# Patient Record
Sex: MALE | Race: WHITE | Employment: FULL TIME | URBAN - METROPOLITAN AREA
[De-identification: names, ages, dates, MRNs, and addresses within clinical notes are randomized per-mention and may not be internally consistent; named-entity substitution may affect disease eponyms.]

---

## 2021-09-27 ENCOUNTER — OFFICE VISIT (OUTPATIENT)
Dept: SURGERY | Facility: CLINIC | Age: 46
End: 2021-09-27
Payer: COMMERCIAL

## 2021-09-27 VITALS
SYSTOLIC BLOOD PRESSURE: 130 MMHG | HEIGHT: 72 IN | BODY MASS INDEX: 23.03 KG/M2 | DIASTOLIC BLOOD PRESSURE: 90 MMHG | WEIGHT: 170 LBS

## 2021-09-27 DIAGNOSIS — G95.9 CERVICAL MYELOPATHY WITH CERVICAL RADICULOPATHY (HCC): Primary | ICD-10-CM

## 2021-09-27 DIAGNOSIS — M54.12 CERVICAL MYELOPATHY WITH CERVICAL RADICULOPATHY (HCC): Primary | ICD-10-CM

## 2021-09-27 PROCEDURE — 3075F SYST BP GE 130 - 139MM HG: CPT | Performed by: PHYSICIAN ASSISTANT

## 2021-09-27 PROCEDURE — 3080F DIAST BP >= 90 MM HG: CPT | Performed by: PHYSICIAN ASSISTANT

## 2021-09-27 PROCEDURE — 3008F BODY MASS INDEX DOCD: CPT | Performed by: PHYSICIAN ASSISTANT

## 2021-09-27 PROCEDURE — 99204 OFFICE O/P NEW MOD 45 MIN: CPT | Performed by: PHYSICIAN ASSISTANT

## 2021-09-27 RX ORDER — METHYLPREDNISOLONE 4 MG/1
TABLET ORAL
Qty: 1 EACH | Refills: 0 | Status: SHIPPED | OUTPATIENT
Start: 2021-09-27

## 2021-09-27 RX ORDER — IBUPROFEN 600 MG/1
600 TABLET ORAL EVERY 6 HOURS PRN
COMMUNITY

## 2021-09-27 NOTE — PROGRESS NOTES
[facility] Neurosurgery Consultation      HISTORY OF PRESENT ILLNESS:Jass Cedeño. is a 42year old Rh male here for spinal consultation. Gives a history of developing some cervical pain right arm radiculopathy in August 2020.   He saw a DO while he was i 10-point system was reviewed. Pertinent positives and negatives are noted in HPI. PHYSICAL EXAMINATION:  GENERAL:  Patient is in no acute distress. HEENT:  Normocephalic, atraumatic  SKIN: Warm, dry, no rashes.     NEUROLOGICAL:  This patient is aler

## 2021-09-27 NOTE — PROGRESS NOTES
NP for shoulder/arm pain    Having neck pain going down right arm always sore. .    Pain 5/10 worst 7/10     Takes IBU for the pain      Yes PT for 12 weeks did get better   No accident no injections

## 2021-09-27 NOTE — PATIENT INSTRUCTIONS
Refill policies:    • Allow 2-3 business days for refills; controlled substances may take longer.   • Contact your pharmacy at least 5 days prior to running out of medication and have them send an electronic request or submit request through the “request re Depending on your insurance carrier, approval may take 3-10 days. It is highly recommended patients contact their insurance carrier directly to determine coverage.   If test is done without insurance authorization, patient may be responsible for the entire hand  Mild gait dysfunction    PLAN:  Medrol Dosepak, followed by ibuprofen  Physical therapy or chiropractor but no manipulation, cervical traction  If he is refractory to these treatments then we will get x-rays and MRI of the cervical spine and follow-u

## 2022-06-02 ENCOUNTER — HOSPITAL ENCOUNTER (OUTPATIENT)
Age: 47
Discharge: HOME | End: 2022-06-02
Payer: COMMERCIAL

## 2022-06-02 VITALS
TEMPERATURE: 98 F | SYSTOLIC BLOOD PRESSURE: 128 MMHG | OXYGEN SATURATION: 97 % | HEIGHT: 72 IN | RESPIRATION RATE: 17 BRPM | BODY MASS INDEX: 23.03 KG/M2 | HEART RATE: 65 BPM | DIASTOLIC BLOOD PRESSURE: 86 MMHG | WEIGHT: 170 LBS

## 2022-06-02 DIAGNOSIS — Z20.822 CONTACT WITH AND (SUSPECTED) EXPOSURE TO COVID-19: Primary | ICD-10-CM

## 2022-06-02 DIAGNOSIS — H66.90 ACUTE OTITIS MEDIA, UNSPECIFIED OTITIS MEDIA TYPE: ICD-10-CM

## 2022-06-02 LAB — SARS-COV-2 RNA RESP QL NAA+PROBE: NOT DETECTED

## 2022-06-02 PROCEDURE — 99213 OFFICE O/P EST LOW 20 MIN: CPT | Performed by: NURSE PRACTITIONER

## 2022-06-02 PROCEDURE — U0002 COVID-19 LAB TEST NON-CDC: HCPCS | Performed by: NURSE PRACTITIONER

## 2022-06-02 RX ORDER — AMOXICILLIN AND CLAVULANATE POTASSIUM 875; 125 MG/1; MG/1
1 TABLET, FILM COATED ORAL 2 TIMES DAILY
Qty: 14 TABLET | Refills: 0 | Status: SHIPPED | OUTPATIENT
Start: 2022-06-02 | End: 2022-06-09

## 2024-10-07 ENCOUNTER — HOSPITAL ENCOUNTER (OUTPATIENT)
Age: 49
Discharge: HOME | End: 2024-10-07
Payer: COMMERCIAL

## 2024-10-07 VITALS
TEMPERATURE: 98 F | RESPIRATION RATE: 18 BRPM | DIASTOLIC BLOOD PRESSURE: 95 MMHG | HEART RATE: 66 BPM | SYSTOLIC BLOOD PRESSURE: 134 MMHG | OXYGEN SATURATION: 98 %

## 2024-10-07 DIAGNOSIS — H01.004 BLEPHARITIS OF LEFT UPPER EYELID, UNSPECIFIED TYPE: Primary | ICD-10-CM

## 2024-10-07 DIAGNOSIS — H10.32 ACUTE BACTERIAL CONJUNCTIVITIS OF LEFT EYE: ICD-10-CM

## 2024-10-07 PROCEDURE — 99213 OFFICE O/P EST LOW 20 MIN: CPT | Performed by: NURSE PRACTITIONER

## 2024-10-07 RX ORDER — PANTOPRAZOLE SODIUM 20 MG/1
20 TABLET, DELAYED RELEASE ORAL
COMMUNITY

## 2024-10-07 RX ORDER — ERYTHROMYCIN 5 MG/G
1 OINTMENT OPHTHALMIC EVERY 6 HOURS
Qty: 1 G | Refills: 1 | Status: SHIPPED | OUTPATIENT
Start: 2024-10-07 | End: 2024-10-14

## 2024-10-07 NOTE — ED INITIAL ASSESSMENT (HPI)
Pt c/o left eye irritation, started last night, denies vision changes, however rpt increased redness, swelling and discharge this morning

## 2024-10-07 NOTE — DISCHARGE INSTRUCTIONS
Blepharitis / Eyelid infection care measures   - Apply antibiotic ointment to lid margins 4x a day for up to a month (only use while having symptoms) (ointment is OK to go into your eye)   - Apply warm compressed to closed lid for 5-10 minutes, 2-4 times a day. Then, massage lids with cotton swab soaked in a mixture of baby shampoo and water (1:1 ratio)   - Do not use contacts   - Change pillow cases daily for the next couple days   - You may warrant re-evaluation if you have diffuse redness and/or swelling to the area around your eye   - Follow up with your primary care provider as needed    Conjunctivitis supportive care and infection control measures:   - Use eye medication as directed and for a full 7-10 days (do not let bottle/tube touch the eye, as this can contaminate the container)   - Wash hands often   - Do not rub / touch eyes. If you do, immediate wash your hands.   - Do not use face towel more than once before washing   - Do not use contacts (if applicable)   - Changes pillow case daily for 2-3 days   - Disinfect environment as appropriate (e.g. toys, handles, \"high touch items / surfaces\")   - You are considered contagious for 24hrs from starting antibiotics   - Follow up with primary care provider as needed       - Take multivitamins, Vitamin D (2000IU) daily   - You may benefit from taking Zyrtec or Xyzal daily for 1-week

## 2024-10-07 NOTE — ED PROVIDER NOTES
History     Chief Complaint   Patient presents with    Eye Problem       Subjective:   HPI    Jass Mayorga Jr., 49 year old male with notable medical history of n/a who presents with Left eye concerns. Patient reports s/s started last night with Left eyelid irritation which worsened today along with his eye being stuck shut with discharge. Denies URI symptoms, fever, chills, contact lens use.        Objective:   Past Medical History:    AR (allergic rhinitis)    DAJA (obstructive sleep apnea)    mild, resolved after DNS repair.              Past Surgical History:   Procedure Laterality Date    Colonoscopy  3/6/15  [facility] ([place])    Blood in stool: moderate internal hemorrhoids; next colonoscopy at age 52    Colonoscopy N/A 3/6/2015    Procedure: COLONOSCOPY;  Surgeon: Jamey Logan MD;  Location:  ENDOSCOPY    Other surgical history  2006 (approx)    deviated septum                Social History     Socioeconomic History    Marital status:     Number of children: 3   Occupational History    Occupation: .     Employer: [employer]   Tobacco Use    Smoking status: Never    Smokeless tobacco: Never   Vaping Use    Vaping status: Never Used   Substance and Sexual Activity    Alcohol use: No     Comment: socially    Drug use: No     Social Determinants of Health      [facility] from [facility]    Housing Stability              No current facility-administered medications on file prior to encounter.     Current Outpatient Medications on File Prior to Encounter   Medication Sig Dispense Refill    pantoprazole 20 MG Oral Tab EC Take 1 tablet (20 mg total) by mouth every morning before breakfast.      ibuprofen 600 MG Oral Tab Take 600 mg by mouth every 6 (six) hours as needed for Pain.      methylPREDNISolone (MEDROL) 4 MG Oral Tablet Therapy Pack As prescribed (Patient not taking: Reported on 6/2/2022) 1 each 0         Review of Systems   Eyes:  Positive for pain,  discharge, redness and itching.   All other systems reviewed and are negative.        Constitutional and vital signs reviewed.      All other systems reviewed and negative except as noted above.    I have reviewed the family history, social history, allergies, and outpatient medications.     History reviewed from EMR: Encounters, problem list, allergies, medications      Physical Exam     ED Triage Vitals [10/07/24 0805]   BP (!) 134/95   Pulse 66   Resp 18   Temp 97.8 °F (36.6 °C)   Temp src Oral   SpO2 98 %   O2 Device None (Room air)       Current:BP (!) 134/95   Pulse 66   Temp 97.8 °F (36.6 °C) (Oral)   Resp 18   SpO2 98%       Physical Exam  Vitals and nursing note reviewed.   Constitutional:       General: He is not in acute distress.     Appearance: Normal appearance. He is normal weight. He is not ill-appearing or toxic-appearing.   HENT:      Head: Normocephalic and atraumatic.      Right Ear: External ear normal.      Left Ear: External ear normal.      Nose: Nose normal. No congestion or rhinorrhea.      Mouth/Throat:      Mouth: Mucous membranes are moist.   Eyes:      General:         Left eye: Discharge present.     Extraocular Movements: Extraocular movements intact.      Conjunctiva/sclera:      Left eye: Left conjunctiva is injected.      Pupils: Pupils are equal, round, and reactive to light.      Comments: Left upper eyelid swelling and erythema (more medially), with conjunctival discharge and injection. Questionable inner stye to upper eyelid (medially). EOMs intact.   Cardiovascular:      Rate and Rhythm: Normal rate.      Pulses: Normal pulses.   Pulmonary:      Effort: Pulmonary effort is normal. No respiratory distress.   Musculoskeletal:         General: No swelling, tenderness or signs of injury. Normal range of motion.      Cervical back: Normal range of motion.   Skin:     General: Skin is warm and dry.      Capillary Refill: Capillary refill takes less than 2 seconds.    Neurological:      General: No focal deficit present.      Mental Status: He is alert and oriented to person, place, and time. Mental status is at baseline.   Psychiatric:         Mood and Affect: Mood normal.         Behavior: Behavior normal.         Thought Content: Thought content normal.         Judgment: Judgment normal.            ED Course     Labs Reviewed - No data to display  No orders to display       Vitals:    10/07/24 0805   BP: (!) 134/95   Pulse: 66   Resp: 18   Temp: 97.8 °F (36.6 °C)   TempSrc: Oral   SpO2: 98%            [facility]        Jass WRIGHT Mukesh Jr., 49 year old male with medical history as noted above who presents with Left eye complaints   - Patient in NAD, VSS   - blepharitis vs conjunctivitis vs stye vs chalazion vs other   - Exam suspicious for blepharitis / conjunctivitis; will treat   - Supportive care and infection control measures discussed   - f/u with primary care provider as needed       ** See ED course below for additional information on care provided / interventions / notable events throughout patient's encounter.         ** I have independently reviewed the radiology images, clinical lab results, and ECG tracings as described above (if applicable)    ** Concerning co-morbidities possibly affecting complaint / care: n/a    ** See below for home care instructions (if applicable)          Medical Decision Making  Risk  OTC drugs.  Prescription drug management.        Disposition and Plan     Clinical Impression:  1. Blepharitis of left upper eyelid, unspecified type    2. Acute bacterial conjunctivitis of left eye         Disposition:  Discharge  10/7/2024  8:29 am    Follow-up:  Malcolm Montano MD  [address] [address]  [phone]      As needed          Medications Prescribed:  Discharge Medication List as of 10/7/2024  8:30 AM        START taking these medications    Details   erythromycin 5 MG/GM Ophthalmic Ointment Apply 1 Application to  eye every 6 (six) hours for 7 days., Normal, Disp-1 g, R-1             The above patient (and/or guardian) was made aware that an appropriate evaluation has been performed, and that no additional testing is required at this time. In my medical judgment, there is currently no evidence of an immediate life-threatening or surgical condition, therefore discharge is indicated at this time. The patient (and/or guardian) was advised that a small risk still exists that a serious condition could develop. The patient was instructed to arrange close follow-up with their primary care provider (or the referral provider given today). The patient received written and verbal instructions regarding their condition / concerns, demonstrated understanding, and is agreement with the outpatient treatment plan.        Home care instructions:    Blepharitis / Eyelid infection care measures   - Apply antibiotic ointment to lid margins 4x a day for up to a month (only use while having symptoms) (ointment is OK to go into your eye)   - Apply warm compressed to closed lid for 5-10 minutes, 2-4 times a day. Then, massage lids with cotton swab soaked in a mixture of baby shampoo and water (1:1 ratio)   - Do not use contacts   - Change pillow cases daily for the next couple days   - You may warrant re-evaluation if you have diffuse redness and/or swelling to the area around your eye   - Follow up with your primary care provider as needed    Conjunctivitis supportive care and infection control measures:   - Use eye medication as directed and for a full 7-10 days (do not let bottle/tube touch the eye, as this can contaminate the container)   - Wash hands often   - Do not rub / touch eyes. If you do, immediate wash your hands.   - Do not use face towel more than once before washing   - Do not use contacts (if applicable)   - Changes pillow case daily for 2-3 days   - Disinfect environment as appropriate (e.g. toys, handles, \"high touch items /  surfaces\")   - You are considered contagious for 24hrs from starting antibiotics   - Follow up with primary care provider as needed       - Take multivitamins, Vitamin D (2000IU) daily   - You may benefit from taking Zyrtec or Xyzal daily for 1-week    Stef Hernandez, DNP, APRN, AGACNP-BC, FNP-C, CNL  Adult-Gerontology Acute Care & Family Nurse Practitioner  [facility]